# Patient Record
Sex: MALE | Race: WHITE
[De-identification: names, ages, dates, MRNs, and addresses within clinical notes are randomized per-mention and may not be internally consistent; named-entity substitution may affect disease eponyms.]

---

## 2018-03-19 ENCOUNTER — HOSPITAL ENCOUNTER (EMERGENCY)
Dept: HOSPITAL 59 - ER | Age: 23
LOS: 1 days | Discharge: HOME | End: 2018-03-20
Payer: SELF-PAY

## 2018-03-19 DIAGNOSIS — R06.02: ICD-10-CM

## 2018-03-19 DIAGNOSIS — F17.210: ICD-10-CM

## 2018-03-19 DIAGNOSIS — J02.9: ICD-10-CM

## 2018-03-19 DIAGNOSIS — R05: ICD-10-CM

## 2018-03-19 DIAGNOSIS — K04.7: Primary | ICD-10-CM

## 2018-03-19 DIAGNOSIS — R06.2: ICD-10-CM

## 2018-03-19 LAB
FLUBV AG SPEC QL IA: NEGATIVE
LEAD BLD-MCNC: NEGATIVE UG/DL

## 2018-03-19 PROCEDURE — 94664 DEMO&/EVAL PT USE INHALER: CPT

## 2018-03-19 PROCEDURE — 94640 AIRWAY INHALATION TREATMENT: CPT

## 2018-03-19 PROCEDURE — 87400 INFLUENZA A/B EACH AG IA: CPT

## 2018-03-19 PROCEDURE — 99283 EMERGENCY DEPT VISIT LOW MDM: CPT

## 2018-03-19 PROCEDURE — 71046 X-RAY EXAM CHEST 2 VIEWS: CPT

## 2018-03-19 NOTE — EMERGENCY DEPARTMENT RECORD
History of Present Illness





- General


Chief Complaint: Shortness of breath


Stated Complaint: EDDIE,COUGH, VOMITING,TOOTHACHE


Time Seen by Provider: 18 23:17


Source: Patient


Mode of Arrival: Ambulatory


Limitations: No limitations





- History of Present Illness


Initial Comments: 


24 yo male presents with cough for 2 days.  He has had clear phlegm production.

  He has had sore throat and subjective fevers.  He has had left sided dental 

pain and now mild swelling of the left cheek.  He has a hoarse voice.  He is a 

smoker and had asthma as a child.  No diarrhea.  No rash.  The phlegm makes him 

vomit at times.  No edema.  No chest pain.  





MD Complaint: Cough


Onset/Timin


-: Days(s)


Severity scale (1-10): 8


Quality: Aching


Consistency: Constant


Improves With: Nothing


Worsens With: Nothing


Known History Of: Asthma


Associated Symptoms: Cough, Other (dental pain)


Treatments Prior to Arrival: Asprin





- Related Data


 Previous Rx's











 Medication  Instructions  Recorded


 


Amoxicillin 500Mg Capsule [Amoxil] 500 mg PO TID #30 tab 18


 


Prednisone [Prednisone 20Mg] 20 mg PO BID #10 tab 18











 Allergies











Allergy/AdvReac Type Severity Reaction Status Date / Time


 


No Known Drug Allergies Allergy   Verified 18 23:01














Travel Screening





- Travel/Exposure Within Last 30 Days


Have you traveled within the last 30 days?: No





- Travel/Exposure Within Last Year


Have you traveled outside the [place] in the last year?: No





- Additonal Travel Details


Have you been exposed to anyone with a communicable illness?: No





- Travel Symptoms


Symptom Screening: None





Review of Systems


Constitutional: Reports: Chills, Fever.  Denies: Malaise, Weakness


Eyes: Denies: Eye discharge, Eye pain, Photophobia, Vision change


ENT: Reports: As per HPI, Congestion, Dental pain, Throat pain.  Denies: Ear 

pain


Respiratory: Reports: As per HPI, Cough


Cardiovascular: Denies: Chest pain, Palpitations, Syncope


Endocrine: Denies: Fatigue


Gastrointestinal: Denies: Abdominal pain, Diarrhea, Nausea, Vomiting


Genitourinary: Denies: Dysuria, Frequency, Hematuria


Musculoskeletal: Reports: Myalgia.  Denies: Arthralgia, Back pain, Joint 

swelling


Skin: Denies: Bruising, Change in color, Rash


Neurological: Reports: Headache.  Denies: Abnormal gait, Confusion, Numbness, 

Seizure, Tingling, Tremors, Vertigo, Weakness


Psychiatric: Denies: Anxiety


Hematological/Lymphatic: Reports: Swollen glands.  Denies: Anemia, Blood Clots, 

Easy bleeding, Easy bruising





Past Medical History





- SOCIAL HISTORY


Smoking Status: Current every day smoker


Alcohol Use: Heavy


Drug Use: Occasional


Drug Use Detail:: Marijuana





- RESPIRATORY


Hx Respiratory Disorders: No





- CARDIOVASCULAR


Hx Cardio Disorders: No





- NEURO


Hx Neuro Disorders: No





- GI


Hx GI Disorders: No





- 


Hx Genitourinary Disorders: No





- ENDOCRINE


Hx Endocrine Disorders: No





- MUSCULOSKELETAL


Hx Musculoskeletal Disorders: No





- PSYCH


Hx Psych Problems: No





- HEMATOLOGY/ONCOLOGY


Hx Hematology/Oncology Disorders: No





Family Medical History


Any Significant Family History?: No





Physical Exam





- General


General Appearance: Alert, Oriented x3, Cooperative, No acute distress


Limitations: No limitations





- Head


Head exam: Atraumatic, Normocephalic, Normal inspection





- Eye


Eye exam: Normal appearance, PERRL, EOMI.  negative: Conjunctival injection, 

Periorbital swelling, Scleral icterus





- ENT


ENT exam: Normal exam, Mucous membranes moist, TM's normal bilaterally.  

negative: Mucous membranes dry


Ear exam: Normal external inspection


Nasal Exam: Discharge.  negative: Active bleeding, Dried blood, Sinus tenderness


Mouth exam: Normal external inspection, Other (Mildly hoarse).  negative: 

Drooling, Laceration, Muffled voice, Tongue elevation, Tongue normal, Trismus


Teeth exam: Dental caries, Dental tenderness # (16)


Throat exam: Normal inspection.  negative: Tonsillar erythema, Tonsillomegaly, 

Tonsillar exudate, R peritonsillar mass, L peritonsillar mass





- Neck


Neck exam: Normal inspection, Full ROM, Lymphadenopathy (mild anterior cervical)

.  negative: Meningismus, Tenderness, Thyromegaly





- Respiratory


Respiratory exam: Decreased breath sounds, Rhonchi, Wheezes.  negative: Normal 

lung sounds bilaterally, Accessory muscle use, Prolonged expiratory, Rales, 

Respiratory distress, Stridor





- Cardiovascular


Cardiovascular Exam: Regular rate, Normal rhythm, Normal heart sounds


Peripheral Pulses: 2+: Radial (R), Radial (L)





- GI/Abdominal


GI/Abdominal exam: Soft.  negative: Tenderness





- Rectal


Rectal exam: Deferred





- 


 exam: Deferred





- Extremities


Extremities exam: Normal inspection.  negative: Calf tenderness, Pedal edema, 

Tenderness





- Back


Back exam: Reports: Normal inspection, Full ROM.  Denies: Muscle spasm, Rash 

noted, Tenderness





- Neurological


Neurological exam: Alert, Normal gait, Oriented X3





- Psychiatric


Psychiatric exam: Normal affect, Normal mood.  negative: Agitated, Anxious





- Skin


Skin exam: Dry, Intact, Normal color, Warm





Course





 Vital Signs











  18





  23:00


 


Temperature 98.3 F


 


Pulse Rate 66


 


Respiratory 20





Rate 


 


Blood Pressure 143/95


 


Pulse Ox 97














- Reevaluation(s)


Reevaluation #1: 


Duoneb ordered for diffuse wheezing


CXR and Influenza ordered


Amoxicillin provided for likely #16 dental infection


18 23:30





18 23:33


Non labored


Vital reviewed.  No tachycardia, hypoxia or fever.


18 23:46


Influenza is negative


18 00:20


The CXR was reviewed.  No acute infiltrate on the preliminary read.


18 00:21


No hypoxia in the ED


He will be DC with an inhaler, prednisone, amoxicillin for his cough, wheezing, 

and dental pain





Disposition


Disposition: Discharge


Clinical Impression: 


 Dental infection





Disposition: Home, Self-Care


Condition: (1) Good


Instructions:  Dental Abscess (ED), Acute Bronchitis (ED), Wheezing (ED)


Additional Instructions: 


Return in the next 2-3 days if not improving


Return sooner if worse, short of breath or any new concerns


Take the Amoxicillin as directed


Call for a follow up dental examination of the tender tooth


Take the Prednisone as directed for the next 5 days for the lungs


Use the inhaler as directed every 4-6 hours 2 puffs.


Prescriptions: 


Amoxicillin 500Mg Capsule [Amoxil] 500 mg PO TID #30 tab


Prednisone [Prednisone 20Mg] 20 mg PO BID #10 tab


Forms:  Patient Portal Access


Time of Disposition: 00:21





Quality





- Quality Measures


Quality Measures: N/A





- Blood Pressure Screening


Does Patient Have Any of the Following: No


Blood Pressure Classification: Hypertensive Reading


Systolic Measurement: 143


Diastolic Measurement: 95


Screening for High Blood Pressure: < Pre-Hypertensive BP, F/U Documented > [

]


Pre-Hypertensive Follow-up Interventions: Referral to alternative/primary care 

provider.

## 2018-03-21 NOTE — RADIOLOGY REPORT
EXAM:  CHEST, TWO VIEWS



HISTORY:  CHEST PAIN. 



TECHNIQUE:  Frontal and lateral views of the chest were obtained.  



Comparison:  None.  



FINDINGS:  The heart size is normal.  Azygos fissure incidentally noted.  The 
lungs are clear.  No pneumothorax.  



IMPRESSION:  

NO ACUTE CARDIOPULMONARY PROCESS.  



JOB NUMBER:  [id number]
[facility]

## 2018-07-02 ENCOUNTER — HOSPITAL ENCOUNTER (EMERGENCY)
Dept: HOSPITAL 59 - ER | Age: 23
Discharge: HOME | End: 2018-07-02
Payer: COMMERCIAL

## 2018-07-02 DIAGNOSIS — F17.210: ICD-10-CM

## 2018-07-02 DIAGNOSIS — S46.001A: Primary | ICD-10-CM
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
## 2018-07-02 PROCEDURE — 99283 EMERGENCY DEPT VISIT LOW MDM: CPT
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
## 2018-07-05 NOTE — RADIOLOGY REPORT
EXAM:  RIGHT SHOULDER



HISTORY:  PAIN.  



TECHNIQUE:  Three views of the right shoulder were performed.  



FINDINGS:  No evidence of fracture or dislocation.  No lytic or blastic lesion.
  



IMPRESSION:  

NEGATIVE RIGHT SHOULDER EXAMINATION.  



JOB NUMBER:  [id number]
[facility]